# Patient Record
(demographics unavailable — no encounter records)

---

## 2025-03-24 NOTE — HISTORY OF PRESENT ILLNESS
[FreeTextEntry1] : 51-year-old male with long-standing history of essential hypertension , on multiple meds. Significant white coat effect noted over the years. States that he stopped drinking alcohol cold turkey (was drinking 6-7 shots every other night prior) and since then feels more anxious, heart racing at times. Has [employer] watch, no significant dysrhythmia noted.  Self measured BP's at home generally in 110's-120's ,log reviewed.

## 2025-03-24 NOTE — DISCUSSION/SUMMARY
[Essential Hypertension] : essential hypertension [Plasma Catecholamines] : plasma catecholamines [___ Month(s)] : in [unfilled] month(s) [EKG obtained to assist in diagnosis and management of assessed problem(s)] : EKG obtained to assist in diagnosis and management of assessed problem(s) [de-identified] : cannot assess, patient noncompliant with medications. [de-identified] : renal sonogram [FreeTextEntry1] : Continue current medical regimen.  Patient continues to self monitor blood pressure and heart rate.  Significant element of white coat effect likely.  Reviewed results of TTE. Exercising regularly, now with more light weights.  Will discuss stress testing for ECG abnormality at next visit. Reuested refill on Sildenafil for ED. f/u labs at PCP.

## 2025-03-24 NOTE — CARDIOLOGY SUMMARY
[___] : [unfilled] [LVEF ___%] : LVEF [unfilled]% [de-identified] : 3/24/25, Sinus Rhythm , -Old anterior infarct. 2/15/24, Sinus Rhythm, -consider old anterior infarct.-Nonspecific T-abnormality. 5/3/22, Sinus Rhythm, -consider old anterior infarct, - Nonspecific T-abnormality 9/30/21, Sinus Rhythm , -consider old anterior infarct, - Nonspecific T-abnormalit [de-identified] : 3/19/25, 1. Left ventricular cavity is normal in size. Left ventricular wall thickness is normal. Left ventricular systolic function is normal with an ejection fraction of 56 % by Stafford's method of disks. There are no regional wall motion abnormalities seen. 2. There is mild (grade 1) left ventricular diastolic dysfunction, with normal left ventricular filling pressure. 3. Normal right ventricular cavity size, with normal wall thickness, and normal right ventricular systolic function. 4. Trace pulmonic regurgitation.